# Patient Record
Sex: FEMALE | Race: WHITE | NOT HISPANIC OR LATINO | URBAN - NONMETROPOLITAN AREA
[De-identification: names, ages, dates, MRNs, and addresses within clinical notes are randomized per-mention and may not be internally consistent; named-entity substitution may affect disease eponyms.]

---

## 2018-08-28 ENCOUNTER — OFFICE VISIT (OUTPATIENT)
Dept: OBSTETRICS AND GYNECOLOGY | Facility: CLINIC | Age: 23
End: 2018-08-28

## 2018-08-28 VITALS
WEIGHT: 148 LBS | SYSTOLIC BLOOD PRESSURE: 118 MMHG | DIASTOLIC BLOOD PRESSURE: 64 MMHG | HEIGHT: 64 IN | BODY MASS INDEX: 25.27 KG/M2

## 2018-08-28 DIAGNOSIS — Z30.09 ENCOUNTER FOR GENERAL COUNSELING AND ADVICE ON CONTRACEPTIVE MANAGEMENT: ICD-10-CM

## 2018-08-28 DIAGNOSIS — R10.2 CHRONIC PELVIC PAIN IN FEMALE: Primary | ICD-10-CM

## 2018-08-28 DIAGNOSIS — N93.9 ABNORMAL UTERINE BLEEDING (AUB): ICD-10-CM

## 2018-08-28 DIAGNOSIS — G89.29 CHRONIC PELVIC PAIN IN FEMALE: Primary | ICD-10-CM

## 2018-08-28 LAB
HBA1C MFR BLD: 4.9 %
HCG INTACT+B SERPL-ACNC: <2.39 MIU/ML

## 2018-08-28 PROCEDURE — 99204 OFFICE O/P NEW MOD 45 MIN: CPT | Performed by: OBSTETRICS & GYNECOLOGY

## 2018-08-28 NOTE — PROGRESS NOTES
Subjective   Chief Complaint   Patient presents with   • Abdominal Pain     PATIENT STATES SHE WAS DIAGNOSED BY ANOTHER DOCTOR WITH ENDOMETRIOSIS, WIHTOUT SURGERY. LOOKING FOR ADVICE ON HOW TO DEAL WITH THE PAIN, NOT GOOD WITH TAKING BIRHTCONTROL AND PAIN IS GETTIN WORSE.     Kelley Salazar is a 22 y.o. year old  presenting to be seen for pelvic pain, abnormal uterine bleeding and contraception counseling.    She reports long-standing issues with pelvic pain.  The pain is intense located in bilateral lower quadrants and radiates down her legs and back.  The pain is cyclical in nature and worse with menses.  It has been present for many years going back to middle school.  She reports dyskinesia and dyspareunia.  Sex is uncomfortable but the pain worsens after completion of intercourse with a throbbing and aching sensation.  She denies dysuria and other urinary symptoms.  She has been told that she likely has endometriosis, but is never undergone laparoscopy.  She has been treated with various hormonal therapies over the years.  She was on oral birth control pills in middle school and high school.  These had a significant impact on her emotional and mental state so she discontinued them.  Recently, she was on Depo-Provera for roughly 1 year.  She discontinued this late last year.  While she was on Depo, her pain and bleeding were both improved.  This also affected her emotional state, so she is interested in discussing other treatment options.    She reports irregular menses when she is off of hormonal therapy.  She typically bleeds for 9-12 days at a time with only 15 days in between bleeding.  Again, her pain is at its worst while she bleeds so this is a significant portion of each month.  She does report heavy vaginal bleeding requiring tampon changes every hour when bleeding is at its heaviest.  Her last menstrual period was 8/15/18.  She reports that her thyroid was checked several months ago and was  "normal.  She also notes that they performed an ultrasound and told her that her \"female organs looked great\".    She is sexually active with one male partner.  She is not concerned about sexual transmitted infection screening today.  They use condoms occasionally.  She is interesting in discussing contraception options that are low maintenance.    She underwent a colonoscopy this year secondary to irritable bowel.  She reports having a Pap smear done last year that was normal per her report.  She denies any abnormal Pap smears.    She denies nausea, emesis, fevers, chills, significant weight changes, hair/skin/nail changes, dysuria, urinary frequency, palpitations, myalgia, dyspnea.     History  Past Medical History:   Diagnosis Date   • Endometriosis    • Irritable bowel    , Past Surgical History:   Procedure Laterality Date   • WISDOM TOOTH EXTRACTION     , Family History   Problem Relation Age of Onset   • Hypertension Father    • Prostate cancer Father    • Colon cancer Maternal Grandmother    , Social History   Substance Use Topics   • Smoking status: Current Every Day Smoker   • Smokeless tobacco: Never Used   • Alcohol use No   ,   (Not in a hospital admission) and Allergies:  Patient has no known allergies.    Smoking status: Current Every Day Smoker                                                   Packs/day: 0.00      Years: 0.00      Smokeless tobacco: Never Used                          Review of Systems  Pertinent items are noted in HPI, all other systems reviewed and negative       Objective   /64   Ht 162.6 cm (64\")   Wt 67.1 kg (148 lb)   BMI 25.40 kg/m²     Physical Exam:  General Appearance: alert, pleasant, appears stated age, interactive and cooperative  Head: normocephalic, without obvious abnormality and atraumatic  Eyes: lids and lashes normal and no icterus  Ears: ears appear intact with no abnormalities noted  Nose: nares normal, septum midline, mucosa normal and no drainage  Neck: " suppple, trachea midline and no thyromegaly  Back: no kyphosis present, no scoliosis present and range of motion normal  Lungs: respirations regular, respirations even and respirations unlabored  Abdomen: no masses, no hepatomegaly, no splenomegaly, soft non-tender, no guarding and no rebound tenderness  Extremities: moves extremities well, no edema, no cyanosis and no redness  Skin: no bleeding, bruising or rash and no lesions noted  Lymph Nodes: no palpable adenopathy  Neurologic: Cranial Nerves cranial nerves 2 - 12 grossly intact, Speech normal content and execusion, Coordination normal  Psych: normal mood and affect, oriented to person, time and place, thought content organized and appropriate judgment    Pelvis:  Pelvic: Clinical staff was present for exam  External genitalia:  normal appearance of the external genitalia including Bartholin's and [place]'s glands.  :  urethral meatus normal;  Vagina:  normal pink mucosa without prolapse or lesions.  Mild pelvic floor discomfort with palpation.  Cervix:  normal appearance.  Uterus:  normal size, shape and consistency.  Adnexa:  normal bimanual exam of the adnexa.    Review of Labs:   No data reviewed    Review of Imaging:  No data reviewed    Decision to obtain old records:  YES. - Pap smear, TSH, ultrasound, notes pertaining to previous treatment    Summarization of old records:  N/A    Independent review of image, tracing or specimen:  N/A       Assessment   1.  Cyclical pelvic pain, consistent with endometriosis  2.  Abnormal uterine bleeding with menometrorrhagia  3.  Encounter for general counseling on contraception     Plan      1. We discussed that her HPI is consistent with endometriosis.  We discussed this diagnosis and its ramifications.  We discussed that laparoscopy is the cold standard for diagnosis, however often we can attempt a try-and-see approach with medical therapy.  If we can improve her pain and bleeding with medical therapy, we can save  her a surgery.  Given that she improved with both birth control pills and Depo, I am optimistic that medical therapy will yield good results.  I recommend the Mirena IUD as it has been well studied in both pain and bleeding populations and will provide excellent birth control.  After discussion including risks, benefits and alternatives, she would like to move forward with IUD placement.  Given that she is self-pay, this can be expensive.  Plan A will be to obtain this IUD from the local health department.  Plan B will be obtaining a Liletta device through a specialty pharmacy.  We discussed that Liletta is essentially a generic form of Mirena and that it is significantly cheaper.  She will return for placement of an IUD with next menses.    2.  Her bleeding seems to be both heavy and irregular when she is not on hormonal therapy.  We will obtain old records to better understand what is already been done and workup.  She reports her thyroid was recently checked and found to be normal.  We'll verify this resolved.  I did order a hemoglobin A1c today for screening as well as an hCG.  We discussed the good bleeding profile with Mirena and that this device should adequately address this issue.    3.  Given her attempts with other therapy modalities, the IUD seems to be the best option.  I recommended consistent barrier contraception until the IUD can be placed.     Follow up prn for IUD placement    Samuel Estrella MD  Obstetrics and Gynecology  [facility]

## 2018-08-29 PROBLEM — R10.2 CHRONIC PELVIC PAIN IN FEMALE: Status: ACTIVE | Noted: 2018-08-29

## 2018-08-29 PROBLEM — G89.29 CHRONIC PELVIC PAIN IN FEMALE: Status: ACTIVE | Noted: 2018-08-29

## 2019-08-24 ENCOUNTER — OFFICE VISIT (OUTPATIENT)
Dept: RETAIL CLINIC | Facility: CLINIC | Age: 24
End: 2019-08-24

## 2019-08-24 VITALS
BODY MASS INDEX: 26.61 KG/M2 | SYSTOLIC BLOOD PRESSURE: 108 MMHG | WEIGHT: 155 LBS | DIASTOLIC BLOOD PRESSURE: 72 MMHG | TEMPERATURE: 99.2 F | HEART RATE: 98 BPM | RESPIRATION RATE: 16 BRPM | OXYGEN SATURATION: 98 %

## 2019-08-24 DIAGNOSIS — J01.40 ACUTE NON-RECURRENT PANSINUSITIS: Primary | ICD-10-CM

## 2019-08-24 DIAGNOSIS — J02.9 ACUTE PHARYNGITIS, UNSPECIFIED ETIOLOGY: ICD-10-CM

## 2019-08-24 LAB
EXPIRATION DATE: NORMAL
INTERNAL CONTROL: NORMAL
Lab: NORMAL
S PYO AG THROAT QL: NEGATIVE

## 2019-08-24 PROCEDURE — 87880 STREP A ASSAY W/OPTIC: CPT | Performed by: NURSE PRACTITIONER

## 2019-08-24 PROCEDURE — 99213 OFFICE O/P EST LOW 20 MIN: CPT | Performed by: NURSE PRACTITIONER

## 2019-08-24 RX ORDER — AMOXICILLIN 875 MG/1
875 TABLET, COATED ORAL 2 TIMES DAILY
Qty: 20 TABLET | Refills: 0 | Status: SHIPPED | OUTPATIENT
Start: 2019-08-24 | End: 2019-09-03

## 2019-08-24 RX ORDER — FLUTICASONE PROPIONATE 50 MCG
2 SPRAY, SUSPENSION (ML) NASAL DAILY PRN
Qty: 1 BOTTLE | Refills: 0 | Status: SHIPPED | OUTPATIENT
Start: 2019-08-24 | End: 2019-09-23

## 2019-08-24 RX ORDER — LORATADINE 10 MG/1
10 TABLET ORAL DAILY
Qty: 30 TABLET | Refills: 0 | Status: SHIPPED | OUTPATIENT
Start: 2019-08-24 | End: 2019-09-13

## 2019-08-24 NOTE — PROGRESS NOTES
Juan Salazar is a 23 y.o. female.     Sore Throat    This is a new problem. Episode onset: one week ago. The problem has been unchanged. Neither side of throat is experiencing more pain than the other. There has been no fever (up to 99.3). The pain is at a severity of 5/10. Associated symptoms include congestion, coughing (mild), ear pain (burning sensation at times), headaches, a hoarse voice and vomiting (3x since being sick). Pertinent negatives include no abdominal pain, diarrhea, drooling, ear discharge, plugged ear sensation, neck pain, shortness of breath, stridor, swollen glands or trouble swallowing. She has had no exposure to strep. Treatments tried: nyquil, throat tea. The treatment provided mild relief.        No current outpatient medications on file prior to visit.     No current facility-administered medications on file prior to visit.        No Known Allergies    Past Medical History:   Diagnosis Date   • Endometriosis    • Irritable bowel        Past Surgical History:   Procedure Laterality Date   • WISDOM TOOTH EXTRACTION         Family History   Problem Relation Age of Onset   • Hypertension Father    • Prostate cancer Father    • Colon cancer Maternal Grandmother        Social History     Socioeconomic History   • Marital status: Single     Spouse name: Not on file   • Number of children: Not on file   • Years of education: Not on file   • Highest education level: Not on file   Tobacco Use   • Smoking status: Former Smoker     Last attempt to quit: 2019     Years since quittin.6   • Smokeless tobacco: Never Used   Substance and Sexual Activity   • Alcohol use: No   • Drug use: No   • Sexual activity: Defer       Review of Systems   Constitutional: Positive for activity change, appetite change, chills and diaphoresis. Negative for fever.   HENT: Positive for congestion, ear pain (burning sensation at times), hoarse voice, sinus pressure and sore throat. Negative for drooling,  ear discharge, rhinorrhea and trouble swallowing.    Respiratory: Positive for cough (mild). Negative for shortness of breath and stridor.    Cardiovascular: Negative for chest pain.   Gastrointestinal: Positive for nausea and vomiting (3x since being sick). Negative for abdominal pain and diarrhea.   Musculoskeletal: Negative for neck pain.   Skin: Negative for rash.   Neurological: Positive for headaches.       /72   Pulse 98   Temp 99.2 °F (37.3 °C)   Resp 16   Wt 70.3 kg (155 lb)   SpO2 98%   Breastfeeding? No   BMI 26.61 kg/m²     Objective   Physical Exam   Constitutional: She is oriented to person, place, and time. She appears well-developed and well-nourished. She is cooperative.   HENT:   Head: Normocephalic.   Right Ear: Tympanic membrane, external ear and ear canal normal.   Left Ear: Tympanic membrane, external ear and ear canal normal.   Nose: Rhinorrhea present. Right sinus exhibits maxillary sinus tenderness and frontal sinus tenderness. Left sinus exhibits maxillary sinus tenderness and frontal sinus tenderness.   Mouth/Throat: Uvula is midline and mucous membranes are normal. Posterior oropharyngeal erythema present. Tonsils are 1+ on the right. Tonsils are 1+ on the left. No tonsillar exudate.   Cobblestone appearance   Eyes: Conjunctivae, EOM and lids are normal.   Cardiovascular: Normal rate, regular rhythm and normal heart sounds.   Pulmonary/Chest: Effort normal and breath sounds normal. No accessory muscle usage. No respiratory distress.   Lymphadenopathy:     She has no cervical adenopathy.   Neurological: She is alert and oriented to person, place, and time.   Skin: Skin is warm, dry and intact.   Psychiatric: She has a normal mood and affect. Her speech is normal and behavior is normal.         Assessment/Plan   Kelley was seen today for sore throat.    Diagnoses and all orders for this visit:    Acute non-recurrent pansinusitis  -     amoxicillin (AMOXIL) 875 MG tablet; Take 1  tablet by mouth 2 (Two) Times a Day for 10 days.  -     loratadine (CLARITIN) 10 MG tablet; Take 1 tablet by mouth Daily for 30 days.  -     fluticasone (FLONASE) 50 MCG/ACT nasal spray; 2 sprays into the nostril(s) as directed by provider Daily As Needed for Allergies for up to 30 days.    Acute pharyngitis, unspecified etiology  -     POCT rapid strep A        Results for orders placed or performed in visit on 08/24/19   POCT rapid strep A   Result Value Ref Range    Rapid Strep A Screen Negative Negative, VALID, INVALID, Not Performed    Internal Control Passed Passed    Lot Number [id number]     Expiration Date 3/21        Follow up with PCP or go to the nearest emergency room if symptoms worsen or fail to improve.

## 2019-08-24 NOTE — PATIENT INSTRUCTIONS
Sinusitis, Adult  Sinusitis is soreness and inflammation of your sinuses. Sinuses are hollow spaces in the bones around your face. Your sinuses are located:  · Around your eyes.  · In the middle of your forehead.  · Behind your nose.  · In your cheekbones.  Your sinuses and nasal passages are lined with a stringy fluid (mucus). Mucus normally drains out of your sinuses. When your nasal tissues become inflamed or swollen, mucus can become trapped or blocked. Blocked or trapped mucus makes it difficult for air to flow through your sinuses. This allows bacteria, viruses, and funguses to grow, which leads to infection. Most infections of the sinuses are caused by a virus.  Sinusitis can develop quickly. It can last for 7?10 days (acute) or for more than 12 weeks (chronic). Sinusitis often develops after a cold.  What are the causes?  This condition is caused by anything that creates swelling in the sinuses or stops mucus from draining, including:  · Allergies.  · Asthma.  · Bacterial or viral infection.  · Abnormally shaped bones between the nasal passages.  · Nasal growths that contain mucus (nasal polyps).  · Narrow sinus openings.  · Pollutants, such as chemicals or irritants in the air.  · A foreign object stuck in the nose.  · A fungal infection. This is rare.  What increases the risk?  The following factors may make you more likely to develop this condition:  · Having allergies or asthma.  · Having had a recent cold or respiratory tract infection.  · Having structural deformities or blockages in your nose or sinuses.  · Having a weak immune system.  · Doing a lot of swimming or diving.  · Overusing nasal sprays.  · Smoking.  What are the signs or symptoms?  The main symptoms of this condition are pain and a feeling of pressure around the affected sinuses. Other symptoms include:  · Upper toothache.  · Earache.  · Headache.  · Bad breath.  · Decreased sense of smell and taste.  · A cough that may get worse at  night.  · Fatigue.  · Fever.  · Thick drainage from your nose. The drainage is often green and it may contain pus (purulent).  · Stuffy nose or congestion.  · Postnasal drip. This is when extra mucus collects in the throat or back of the nose.  · Swelling and warmth over the affected sinuses.  · Sore throat.  · Sensitivity to light.  How is this diagnosed?  This condition is diagnosed based on symptoms, a medical history, and a physical exam. To find out if your condition is acute or chronic, your health care provider may:  · Look in your nose for signs of nasal polyps.  · Tap over the affected sinus to check for signs of infection.  · View the inside of your sinuses using an imaging device that has a light attached (endoscope).  If your health care provider suspects that you have chronic sinusitis, you may also:  · Be tested for allergies.  · Have a sample of mucus taken from your nose (nasal culture) and checked for bacteria.  · Have a mucus sample examined to see if your sinusitis is related to an allergy.  If your sinusitis does not respond to treatment and it lasts longer than 8 weeks, you may have an MRI or CT scan to check your sinuses. These scans also help to determine how severe your infection is.  In rare cases, a bone biopsy may be done to rule out more serious types of fungal sinus disease.  How is this treated?  Treatment for sinusitis depends on the cause and whether your condition is chronic or acute. If a virus is causing your sinusitis, your symptoms will go away on their own within 10 days. You may be given medicines to relieve your symptoms, including:  · Topical nasal decongestants. They shrink swollen nasal passages and let mucus drain from your sinuses.  · Antihistamines. These drugs block inflammation that is triggered by allergies. This can help to ease swelling in your nose and sinuses.  · Topical nasal corticosteroids. These are nasal sprays that ease inflammation and swelling in your nose  and sinuses.  · Nasal saline washes. These rinses can help to get rid of thick mucus in your nose.  If your condition is caused by bacteria, your health care provider may recommend waiting to see if your symptoms improve. Most bacterial infections will get better without antibiotic medicine. If you have a severe infection or a weak immune system, you may be prescribed antibiotics.  Surgery may be needed to correct underlying conditions, such as narrow nasal passages. Surgery may also be needed to remove polyps.  Follow these instructions at home:  Medicines  · Take, use, or apply over-the-counter and prescription medicines only as told by your health care provider. These may include nasal sprays.  · If you were prescribed an antibiotic, take it as told by your health care provider. Do not stop taking the antibiotic even if you start to feel better.  Hydrate and Humidify  · Drink enough water to keep your urine clear or pale yellow. Staying hydrated will help to thin your mucus.  · Use a cool mist humidifier to keep the humidity level in your home above 50%.  · Inhale steam for 10-15 minutes, 3-4 times a day or as told by your health care provider. You can do this in the bathroom while a hot shower is running.  · Limit your exposure to cool or dry air.  Rest  · Rest as much as possible.  · Sleep with your head raised (elevated).  · Make sure to get enough sleep each night.  General instructions  · Apply a warm, moist washcloth to your face 3-4 times a day or as told by your health care provider. This will help with discomfort.  · Wash your hands often with soap and water to reduce your exposure to viruses and other germs. If soap and water are not available, use hand .  · Do not smoke. Avoid being around people who are smoking (secondhand smoke).  · Keep all follow-up visits as told by your health care provider. This is important.  Contact a health care provider if:  · You have a fever.  · Your symptoms get  worse.  · Your symptoms do not improve within 10 days.  Get help right away if:  · You have a severe headache.  · You have persistent vomiting.  · You have pain or swelling around your face or eyes.  · You have vision problems.  · You develop confusion.  · Your neck is stiff.  · You have trouble breathing.  This information is not intended to replace advice given to you by your health care provider. Make sure you discuss any questions you have with your health care provider.  Document Released: 12/18/2006 Document Revised: 06/28/2018 Document Reviewed: 10/12/2016  [employer] Interactive Patient Education © 2019 [employer]  Sore Throat  A sore throat is pain, burning, irritation, or scratchiness in the throat. When you have a sore throat, you may feel pain or tenderness in your throat when you swallow or talk.  Many things can cause a sore throat, including:  · An infection.  · Seasonal allergies.  · Dryness in the air.  · Irritants, such as smoke or pollution.  · Gastroesophageal reflux disease (GERD).  · A tumor.  A sore throat is often the first sign of another sickness. It may happen with other symptoms, such as coughing, sneezing, fever, and swollen neck glands. Most sore throats go away without medical treatment.  Follow these instructions at home:    · Take over-the-counter medicines only as told by your health care provider.  · Drink enough fluids to keep your urine clear or pale yellow.  · Rest as needed.  · To help with pain, try:  ? Sipping warm liquids, such as broth, herbal tea, or warm water.  ? Eating or drinking cold or frozen liquids, such as frozen ice pops.  ? Gargling with a salt-water mixture 3-4 times a day or as needed. To make a salt-water mixture, completely dissolve ½-1 tsp of salt in 1 cup of warm water.  ? Sucking on hard candy or throat lozenges.  ? Putting a cool-mist humidifier in your bedroom at night to moisten the air.  ? Sitting in the bathroom with the door closed for 5-10 minutes  while you run hot water in the shower.  · Do not use any tobacco products, such as cigarettes, chewing tobacco, and e-cigarettes. If you need help quitting, ask your health care provider.  Contact a health care provider if:  · You have a fever for more than 2-3 days.  · You have symptoms that last (are persistent) for more than 2-3 days.  · Your throat does not get better within 7 days.  · You have a fever and your symptoms suddenly get worse.  Get help right away if:  · You have difficulty breathing.  · You cannot swallow fluids, soft foods, or your saliva.  · You have increased swelling in your throat or neck.  · You have persistent nausea and vomiting.  This information is not intended to replace advice given to you by your health care provider. Make sure you discuss any questions you have with your health care provider.  Document Released: 01/25/2006 Document Revised: 08/13/2017 Document Reviewed: 10/07/2016  [employer] Interactive Patient Education © 2019 [employer]

## 2019-09-13 ENCOUNTER — OFFICE VISIT (OUTPATIENT)
Dept: RETAIL CLINIC | Facility: CLINIC | Age: 24
End: 2019-09-13

## 2019-09-13 VITALS
WEIGHT: 156 LBS | BODY MASS INDEX: 26.78 KG/M2 | OXYGEN SATURATION: 99 % | TEMPERATURE: 97.7 F | RESPIRATION RATE: 16 BRPM | DIASTOLIC BLOOD PRESSURE: 64 MMHG | HEART RATE: 71 BPM | SYSTOLIC BLOOD PRESSURE: 106 MMHG

## 2019-09-13 DIAGNOSIS — R59.1 LYMPHADENOPATHY: ICD-10-CM

## 2019-09-13 DIAGNOSIS — R50.9 FEVER, UNSPECIFIED FEVER CAUSE: Primary | ICD-10-CM

## 2019-09-13 LAB
EXPIRATION DATE: NORMAL
FLUAV AG NPH QL: NEGATIVE
FLUBV AG NPH QL: NEGATIVE
HETEROPH AB SER QL LA: NEGATIVE
INTERNAL CONTROL: NORMAL
Lab: NORMAL
S PYO AG THROAT QL: NEGATIVE

## 2019-09-13 PROCEDURE — 87880 STREP A ASSAY W/OPTIC: CPT | Performed by: NURSE PRACTITIONER

## 2019-09-13 PROCEDURE — 87804 INFLUENZA ASSAY W/OPTIC: CPT | Performed by: NURSE PRACTITIONER

## 2019-09-13 PROCEDURE — 86308 HETEROPHILE ANTIBODY SCREEN: CPT | Performed by: NURSE PRACTITIONER

## 2019-09-13 PROCEDURE — 99213 OFFICE O/P EST LOW 20 MIN: CPT | Performed by: NURSE PRACTITIONER

## 2019-09-13 RX ORDER — AMOXICILLIN AND CLAVULANATE POTASSIUM 875; 125 MG/1; MG/1
1 TABLET, FILM COATED ORAL EVERY 12 HOURS SCHEDULED
Qty: 20 TABLET | Refills: 0 | Status: SHIPPED | OUTPATIENT
Start: 2019-09-13 | End: 2019-09-23

## 2019-09-13 RX ORDER — PREDNISONE 20 MG/1
20 TABLET ORAL 3 TIMES DAILY
Qty: 9 TABLET | Refills: 0 | Status: SHIPPED | OUTPATIENT
Start: 2019-09-13 | End: 2019-09-16

## 2019-09-13 NOTE — PATIENT INSTRUCTIONS
Lymphadenopathy    Lymphadenopathy means that your lymph glands are swollen or larger than normal (enlarged). Lymph glands, also called lymph nodes, are collections of tissue that filter bacteria, viruses, and waste from your bloodstream. They are part of your body's disease-fighting system (immune system), which protects your body from germs.  There may be different causes of lymphadenopathy, depending on where it is in your body. Some types go away on their own. Lymphadenopathy can occur anywhere that you have lymph glands, including these areas:  · Neck (cervical lymphadenopathy).  · Chest (mediastinal lymphadenopathy).  · Lungs (hilar lymphadenopathy).  · Underarms (axillary lymphadenopathy).  · Groin (inguinal lymphadenopathy).  When your immune system responds to germs, infection-fighting cells and fluid build up in your lymph glands. This causes some swelling and enlargement. If the lymph glands do not go back to normal after you have an infection or disease, your health care provider may do tests. These tests help to monitor your condition and find the reason why the glands are still swollen and enlarged.  Follow these instructions at home:  · Get plenty of rest.  · Take over-the-counter and prescription medicines only as told by your health care provider. Your health care provider may recommend over-the-counter medicines for pain.  · If directed, apply heat to swollen lymph glands as often as told by your health care provider. Use the heat source that your health care provider recommends, such as a moist heat pack or a heating pad.  ? Place a towel between your skin and the heat source.  ? Leave the heat on for 20-30 minutes.  ? Remove the heat if your skin turns bright red. This is especially important if you are unable to feel pain, heat, or cold. You may have a greater risk of getting burned.  · Check your affected lymph glands every day for changes. Check other lymph gland areas as told by your health  care provider. Check for changes such as:  ? More swelling.  ? Sudden increase in size.  ? Redness or pain.  ? Hardness.  · Keep all follow-up visits as told by your health care provider. This is important.  Contact a health care provider if you have:  · Swelling that gets worse or spreads to other areas.  · Problems with breathing.  · Lymph glands that:  ? Are still swollen after 2 weeks.  ? Have suddenly gotten bigger.  ? Are red, painful, or hard.  · A fever or chills.  · Fatigue.  · A sore throat.  · Pain in your abdomen.  · Weight loss.  · Night sweats.  Get help right away if you have:  · Fluid leaking from an enlarged lymph gland.  · Severe pain.  · Chest pain.  · Shortness of breath.  Summary  · Lymphadenopathy means that your lymph glands are swollen or larger than normal (enlarged).  · Lymph glands (also called lymph nodes) are collections of tissue that filter bacteria, viruses, and waste from the bloodstream. They are part of your body's disease-fighting system (immune system).  · Lymphadenopathy can occur anywhere that you have lymph glands.  · If your enlarged and swollen lymph glands do not go back to normal after you have an infection or disease, your health care provider may do tests to monitor your condition and find the reason why the glands are still swollen and enlarged.  · Check your affected lymph glands every day for changes. Check other lymph gland areas as told by your health care provider.  This information is not intended to replace advice given to you by your health care provider. Make sure you discuss any questions you have with your health care provider.  Document Released: 09/26/2009 Document Revised: 11/02/2018 Document Reviewed: 11/02/2018  [employer] Interactive Patient Education © 2019 [employer]  Fever, Adult    A fever is an increase in the body’s temperature. It is usually defined as a temperature of 100°F (38°C) or higher. Brief mild or moderate fevers generally have no  long-term effects, and they often do not require treatment. Moderate or high fevers may make you feel uncomfortable and can sometimes be a sign of a serious illness or disease. The sweating that may occur with repeated or prolonged fever may also cause dehydration.  Fever is confirmed by taking a temperature with a thermometer. A measured temperature can vary with:  · Age.  · Time of day.  · Location of the thermometer:  ? Mouth (oral).  ? Rectum (rectal).  ? Ear (tympanic).  ? Underarm (axillary).  ? Forehead (temporal).  Follow these instructions at home:  Pay attention to any changes in your symptoms. Take these actions to help with your condition:  · Take over-the counter and prescription medicines only as told by your health care provider. Follow the dosing instructions carefully.  · If you were prescribed an antibiotic medicine, take it as told by your health care provider. Do not stop taking the antibiotic even if you start to feel better.  · Rest as needed.  · Drink enough fluid to keep your urine clear or pale yellow. This helps to prevent dehydration.  · Sponge yourself or bathe with room-temperature water to help reduce your body temperature as needed. Do not use ice water.  · Do not overbundle yourself in blankets or heavy clothes.  Contact a health care provider if:  · You vomit.  · You cannot eat or drink without vomiting.  · You have diarrhea.  · You have pain when you urinate.  · Your symptoms do not improve with treatment.  · You develop new symptoms.  · You develop excessive weakness.  Get help right away if:  · You have shortness of breath or have trouble breathing.  · You are dizzy or you faint.  · You are disoriented or confused.  · You develop signs of dehydration, such as a dry mouth, decreased urination, or paleness.  · You develop severe pain in your abdomen.  · You have persistent vomiting or diarrhea.  · You develop a skin rash.  · Your symptoms suddenly get worse.  This information is not  intended to replace advice given to you by your health care provider. Make sure you discuss any questions you have with your health care provider.  Document Released: 06/13/2002 Document Revised: 08/01/2018 Document Reviewed: 02/11/2016  [employer] Interactive Patient Education © 2019 [employer]

## 2019-09-13 NOTE — PROGRESS NOTES
Juan Salazar is a 23 y.o. female.     Diagnosed with Sinusitis and sore throat on 8/24/19. Completed amoxil as prescribed. Reports symptoms were better while taking amoxil but symptoms returned two days ago, worsening last night. Reports feeling like the left side of her neck is sore and swollen, along with salivary gland swelling under left side of tongue. Denies pain with swallowing.       Sore Throat    The problem has been gradually worsening. The pain is worse on the left side. Maximum temperature: up to 101. The pain is at a severity of 2/10. Associated symptoms include ear pain (left), a hoarse voice, neck pain, swollen glands and vomiting. Pertinent negatives include no abdominal pain, congestion, coughing, diarrhea, drooling, ear discharge, headaches, plugged ear sensation, shortness of breath, stridor or trouble swallowing. Associated symptoms comments: Chills, sweats, bodyaches. She has had no exposure to strep or mono. Treatments tried: motrin. The treatment provided mild relief.      Reports vaccines up to date.    Current Outpatient Medications on File Prior to Visit   Medication Sig Dispense Refill   • fluticasone (FLONASE) 50 MCG/ACT nasal spray 2 sprays into the nostril(s) as directed by provider Daily As Needed for Allergies for up to 30 days. 1 bottle 0   • [DISCONTINUED] loratadine (CLARITIN) 10 MG tablet Take 1 tablet by mouth Daily for 30 days. 30 tablet 0     No current facility-administered medications on file prior to visit.        No Known Allergies    Past Medical History:   Diagnosis Date   • Endometriosis    • Irritable bowel        Past Surgical History:   Procedure Laterality Date   • WISDOM TOOTH EXTRACTION         Family History   Problem Relation Age of Onset   • Hypertension Father    • Prostate cancer Father    • Colon cancer Maternal Grandmother        Social History     Socioeconomic History   • Marital status: Single     Spouse name: Not on file   • Number of  children: Not on file   • Years of education: Not on file   • Highest education level: Not on file   Tobacco Use   • Smoking status: Former Smoker     Last attempt to quit: 2019     Years since quittin.6   • Smokeless tobacco: Never Used   Substance and Sexual Activity   • Alcohol use: No   • Drug use: No   • Sexual activity: Defer       Review of Systems   Constitutional: Positive for activity change, appetite change, chills, diaphoresis and fever.   HENT: Positive for ear pain (left), hoarse voice and sore throat. Negative for congestion, drooling, ear discharge, rhinorrhea, sinus pressure, sneezing and trouble swallowing.    Respiratory: Negative for cough, shortness of breath and stridor.    Cardiovascular: Negative for chest pain.   Gastrointestinal: Positive for nausea and vomiting. Negative for abdominal pain and diarrhea.   Musculoskeletal: Positive for neck pain.   Skin: Negative for rash.   Neurological: Negative for headaches.       /64   Pulse 71   Temp 97.7 °F (36.5 °C)   Resp 16   Wt 70.8 kg (156 lb)   SpO2 99%   Breastfeeding? No   BMI 26.78 kg/m²     Objective   Physical Exam   Constitutional: She is oriented to person, place, and time. She appears well-developed and well-nourished. She is cooperative.   HENT:   Head: Normocephalic.       Right Ear: Tympanic membrane, external ear and ear canal normal.   Left Ear: Tympanic membrane, external ear and ear canal normal.   Nose: Nose normal. Right sinus exhibits no maxillary sinus tenderness and no frontal sinus tenderness. Left sinus exhibits no maxillary sinus tenderness and no frontal sinus tenderness.   Mouth/Throat: Uvula is midline, oropharynx is clear and moist and mucous membranes are normal. No tonsillar exudate.   Eyes: Conjunctivae, EOM and lids are normal.   Cardiovascular: Normal rate, regular rhythm and normal heart sounds.   Pulmonary/Chest: Effort normal and breath sounds normal. No accessory muscle usage. No respiratory  distress.   Lymphadenopathy:        Head (right side): Tonsillar adenopathy present.        Head (left side): Submandibular and tonsillar adenopathy present.   Tender, mobile   Neurological: She is alert and oriented to person, place, and time.   Skin: Skin is warm, dry and intact.   Psychiatric: She has a normal mood and affect. Her speech is normal and behavior is normal.         Assessment/Plan   Kelley was seen today for sore throat.    Diagnoses and all orders for this visit:    Fever, unspecified fever cause  -     amoxicillin-clavulanate (AUGMENTIN) 875-125 MG per tablet; Take 1 tablet by mouth Every 12 (Twelve) Hours for 10 days.  -     predniSONE (DELTASONE) 20 MG tablet; Take 1 tablet by mouth 3 (Three) Times a Day for 3 days.  -     POC Infectious Mononucleosis Antibody  -     POCT rapid strep A  -     POCT Influenza A/B  -     Beta Strep Culture, Throat - Swab, Throat    Lymphadenopathy  -     amoxicillin-clavulanate (AUGMENTIN) 875-125 MG per tablet; Take 1 tablet by mouth Every 12 (Twelve) Hours for 10 days.  -     predniSONE (DELTASONE) 20 MG tablet; Take 1 tablet by mouth 3 (Three) Times a Day for 3 days.  -     POC Infectious Mononucleosis Antibody  -     POCT rapid strep A  -     POCT Influenza A/B  -     Beta Strep Culture, Throat - Swab, Throat        Results for orders placed or performed in visit on 09/13/19   POC Infectious Mononucleosis Antibody   Result Value Ref Range    Monospot Negative Negative    Internal Control Passed Passed    Lot Number [id number]     Expiration Date 4/21    POCT rapid strep A   Result Value Ref Range    Rapid Strep A Screen Negative Negative, VALID, INVALID, Not Performed    Internal Control Passed Passed    Lot Number [id number]     Expiration Date 2/21    POCT Influenza A/B   Result Value Ref Range    Rapid Influenza A Ag Negative Negative    Rapid Influenza B Ag Negative Negative    Internal Control Passed Passed    Lot Number [id number]     Expiration Date 5/21       Discussed with patient possibility of infected salivary gland. Instructed to take meds as prescribed. Eat lemon drops to help stimulate salivary gland production. You will be notified when strep culture results return.   APRN made appt to establish care with PCP for 9/20/2019 at 1:45 with Dr. Carrizales.  Go to the nearest emergency room if symptoms worsen or fail to improve prior to PCP appt.   Patient verbalized understanding.

## 2019-09-16 ENCOUNTER — TELEPHONE (OUTPATIENT)
Dept: RETAIL CLINIC | Facility: CLINIC | Age: 24
End: 2019-09-16

## 2019-09-16 LAB — S PYO THROAT QL CULT: NEGATIVE

## 2019-09-16 NOTE — TELEPHONE ENCOUNTER
Patient notified of negative strep culture. Patient reports that she did develop lymphadenopathy in her groin area after being seen, but all symptoms and lymph node swelling (cervical and groin) are gone at this time. Instructed patient to complete antibiotic as prescribed due to other lymphadenopathy developing and to keep appt to establish care with PCP this week. Patient verbalized understanding.

## 2020-01-29 ENCOUNTER — OFFICE VISIT (OUTPATIENT)
Dept: OBSTETRICS AND GYNECOLOGY | Facility: CLINIC | Age: 25
End: 2020-01-29

## 2020-01-29 VITALS
DIASTOLIC BLOOD PRESSURE: 78 MMHG | SYSTOLIC BLOOD PRESSURE: 124 MMHG | WEIGHT: 150 LBS | HEIGHT: 65 IN | BODY MASS INDEX: 24.99 KG/M2

## 2020-01-29 DIAGNOSIS — Z30.430 ENCOUNTER FOR IUD INSERTION: Primary | ICD-10-CM

## 2020-01-29 PROBLEM — N94.6 DYSMENORRHEA: Status: ACTIVE | Noted: 2020-01-29

## 2020-01-29 PROBLEM — N92.0 MENORRHAGIA WITH REGULAR CYCLE: Status: ACTIVE | Noted: 2020-01-29

## 2020-01-29 LAB
B-HCG UR QL: NEGATIVE
EXPIRATION DATE: NORMAL
INTERNAL NEGATIVE CONTROL: NEGATIVE
INTERNAL POSITIVE CONTROL: POSITIVE
Lab: NORMAL

## 2020-01-29 PROCEDURE — 81025 URINE PREGNANCY TEST: CPT | Performed by: OBSTETRICS & GYNECOLOGY

## 2020-01-29 PROCEDURE — 58300 INSERT INTRAUTERINE DEVICE: CPT | Performed by: OBSTETRICS & GYNECOLOGY

## 2020-01-29 NOTE — PROGRESS NOTES
IUD Insertion    Patient's last menstrual period was 01/20/2020.    Date of procedure:  1/29/2020    Risks and benefits discussed? yes  All questions answered? yes  Consents given by The patient  Written consent obtained? yes    Local anesthesia used:  no    Procedure documentation:    After verifying the patient had a low probability of being pregnant and met the criteria for insertion, a sterile speculum has placed and the cervix was cleansed with an antiseptic solution.  Vaginal discharge was scant.  The anterior lip of the cervix was grasped with a tenaculum and the uterine cavity was gently sounded. There was no difficulty passing the sound through the cervix.  Cervical dilation did not need to be performed prior to placing the IUD.  The uterus was anteverted and sounded to 7 cms.  The Mirena was then prepared per the manufacturers instructions.    The Mirena was advanced to a point 2 cms from the fundus and then the arms were released from the sheath.  The device was advanced to the fundus and the device was released fully from the sheath.. The string was cut 5 cms in length.  Bleeding from the cervix was scant.    She tolerated the procedure without any difficulty.     Post procedure instructions: It was reviewed that the Mirena will not alter the timing of when she bleeds but it may decrease the quantity of flow and cramps.  Roughly 30% of people will be amenorrheic over time.  Efficacy rate of 99.2% over 5 years was discussed.  Spontaneous expulsion rate of 1-2% was also discussed.  If she has any issue with fever or excessive bleeding or pain she is to call the office immediately.  Otherwise I would like to see her back in 5 weeks for f/u appt.    Samuel Estrella MD  Obstetrics and Gynecology  [facility]

## 2020-03-04 ENCOUNTER — OFFICE VISIT (OUTPATIENT)
Dept: OBSTETRICS AND GYNECOLOGY | Facility: CLINIC | Age: 25
End: 2020-03-04

## 2020-03-04 VITALS
BODY MASS INDEX: 25.33 KG/M2 | SYSTOLIC BLOOD PRESSURE: 110 MMHG | DIASTOLIC BLOOD PRESSURE: 70 MMHG | HEIGHT: 65 IN | WEIGHT: 152 LBS

## 2020-03-04 DIAGNOSIS — Z97.5 IUD (INTRAUTERINE DEVICE) IN PLACE: Primary | ICD-10-CM

## 2020-03-04 PROCEDURE — 99212 OFFICE O/P EST SF 10 MIN: CPT | Performed by: OBSTETRICS & GYNECOLOGY

## 2020-03-04 RX ORDER — AMOXICILLIN 875 MG/1
TABLET, COATED ORAL
COMMUNITY
Start: 2020-03-02 | End: 2020-06-25

## 2020-03-05 PROBLEM — Z97.5 IUD (INTRAUTERINE DEVICE) IN PLACE: Status: ACTIVE | Noted: 2020-03-05

## 2020-03-05 NOTE — PROGRESS NOTES
"Chief Complaint   Patient presents with   • Follow-up     5 week IUD check, states she has been bleeding since insertion and has some painful cramps at times.       24 y.o.  female who presents for an IUD string check.    She has no complaints today. She has almost continuous spotting. She has mild, intermittent, short-lived cramps. No problems with sex.    Vitals:    20 1452   BP: 110/70   Weight: 68.9 kg (152 lb)   Height: 165.1 cm (65\")       PHYSICAL EXAM   General appearance: well nourished, well hydrated, no acute distress  Abdomen: soft, non distended, non-tender, no masses  Mental Status Exam:   · Judgment, insight: intact  · Orientation: oriented to time, place, and person  · Memory: intact for recent and remote events  · Mood and affect: no depression, anxiety, or agitation  Cervix: Normal appearance, IUD strings present at 5 cm --> trimmed to 3cm    IMPRESSION/PLAN:    IUD in appropriate position  Continue expectant management for now    RTC as needed or for annual visits    Samuel Estrella MD  Obstetrics and Gynecology  [facility]    "

## 2020-06-25 ENCOUNTER — OFFICE VISIT (OUTPATIENT)
Dept: INTERNAL MEDICINE | Facility: CLINIC | Age: 25
End: 2020-06-25

## 2020-06-25 VITALS
WEIGHT: 152 LBS | DIASTOLIC BLOOD PRESSURE: 70 MMHG | BODY MASS INDEX: 25.33 KG/M2 | HEIGHT: 65 IN | HEART RATE: 61 BPM | TEMPERATURE: 98.4 F | SYSTOLIC BLOOD PRESSURE: 116 MMHG | OXYGEN SATURATION: 100 %

## 2020-06-25 DIAGNOSIS — Z76.89 ENCOUNTER TO ESTABLISH CARE: Primary | ICD-10-CM

## 2020-06-25 DIAGNOSIS — F41.1 GENERALIZED ANXIETY DISORDER: ICD-10-CM

## 2020-06-25 PROCEDURE — 99203 OFFICE O/P NEW LOW 30 MIN: CPT | Performed by: PHYSICIAN ASSISTANT

## 2020-06-25 RX ORDER — DESVENLAFAXINE 25 MG/1
25 TABLET, EXTENDED RELEASE ORAL DAILY
Qty: 90 TABLET | Refills: 1 | Status: SHIPPED | OUTPATIENT
Start: 2020-06-25 | End: 2020-07-27 | Stop reason: SDUPTHER

## 2020-06-25 RX ORDER — BUSPIRONE HYDROCHLORIDE 5 MG/1
TABLET ORAL
Qty: 180 TABLET | Refills: 1 | Status: SHIPPED | OUTPATIENT
Start: 2020-06-25 | End: 2022-06-24

## 2020-06-25 NOTE — PROGRESS NOTES
Subjective   Kelley Salazar is a 24 y.o. female who presents to [facility].    Chief Complaint   Patient presents with   • [facility] Care     anxiety       HPI: Anxiety troubles long term which she dealt with by smoking marijuana regularly until she moved here around 2-3 years ago when she switched to CBD oil which helps but does not feel like enough. She has previously tried Zoloft and Prozac which she took each for 6 months but did not like the side effects and eventually discontinued. With Zoloft and Prozac she experienced weight gain, vertigo and somnolence. She feels she may have a little depression but anxiety is much more bothersome. She sleeps well and sometimes excessively.  Poor appetite for the last 8 months or so since starting night shift.  Occasional thoughts of being better off dead but no suicidal ideation. No HI.         The following portions of the patient's history were reviewed and updated as appropriate: She  has a past medical history of Endometriosis and Irritable bowel.  She does not have any pertinent problems on file.  She  has a past surgical history that includes [place] tooth extraction.  Her family history includes Colon cancer in her maternal grandmother and mother; Hypertension in her father; Prostate cancer in her father.  She  reports that she quit smoking about 17 months ago. She has never used smokeless tobacco. She reports that she has current or past drug history. Drug: Marijuana. She reports that she does not drink alcohol.    Current Outpatient Medications   Medication Sig Dispense Refill   • busPIRone (BUSPAR) 5 MG tablet Take 1-2 tablets by mouth every 8 hours as needed for anxiety. 180 tablet 1   • Desvenlafaxine Succinate ER 25 MG tablet sustained-release 24 hour Take 1 tablet by mouth Daily. 90 tablet 1     No current facility-administered medications for this visit.        Review of Systems  Review of Systems   Constitutional: Negative for activity change,  "appetite change, chills, diaphoresis, fatigue, fever, unexpected weight gain and unexpected weight loss.   HENT: Negative for congestion, dental problem, ear pain, mouth sores, postnasal drip, rhinorrhea, sinus pressure, sneezing, sore throat, swollen glands, trouble swallowing and voice change.    Eyes: Negative for blurred vision, double vision, pain, discharge, redness, itching and visual disturbance.   Respiratory: Negative for cough, choking, chest tightness, shortness of breath and wheezing.    Cardiovascular: Negative for chest pain, palpitations and leg swelling.   Gastrointestinal: Negative for abdominal distention, abdominal pain, blood in stool, constipation, diarrhea, nausea, rectal pain, vomiting, GERD and indigestion.   Endocrine: Negative for cold intolerance, heat intolerance, polydipsia, polyphagia and polyuria.   Genitourinary: Negative for breast discharge, breast lump, breast pain, decreased libido, decreased urine volume, difficulty urinating, dysuria, flank pain, frequency, hematuria, pelvic pain, urgency, vaginal bleeding and vaginal pain.   Musculoskeletal: Negative for arthralgias, back pain, gait problem, myalgias and neck pain.   Skin: Negative for color change, rash and skin lesions.   Allergic/Immunologic: Negative for immunocompromised state.   Neurological: Negative for dizziness, tremors, facial asymmetry, speech difficulty, weakness, light-headedness, numbness, headache, memory problem and confusion.   Hematological: Negative for adenopathy. Does not bruise/bleed easily.   Psychiatric/Behavioral: Positive for dysphoric mood and depressed mood. Negative for agitation, behavioral problems, decreased concentration, hallucinations, self-injury, sleep disturbance, suicidal ideas, negative for hyperactivity and stress. The patient is nervous/anxious.          Objective    /70   Pulse 61   Temp 98.4 °F (36.9 °C)   Ht 165.1 cm (65\")   Wt 68.9 kg (152 lb)   SpO2 100%   BMI 25.29 " kg/m²   Physical Exam   Constitutional: She is oriented to person, place, and time. She appears well-developed and well-nourished. No distress.   HENT:   Head: Normocephalic and atraumatic.   Eyes: Pupils are equal, round, and reactive to light. Conjunctivae and EOM are normal.   Neck: Normal range of motion. Neck supple.   Cardiovascular: Normal rate, regular rhythm and normal heart sounds. Exam reveals no gallop and no friction rub.   No murmur heard.  Pulmonary/Chest: Effort normal and breath sounds normal. No respiratory distress. She has no wheezes. She has no rales.   Abdominal: Soft. Bowel sounds are normal. She exhibits no mass. There is no tenderness. No hernia.   Musculoskeletal: Normal range of motion. She exhibits no edema, tenderness or deformity.   Lymphadenopathy:     She has no cervical adenopathy.   Neurological: She is alert and oriented to person, place, and time. She displays normal reflexes. No cranial nerve deficit or sensory deficit. She exhibits normal muscle tone. Coordination normal.   Skin: Skin is warm and dry. Capillary refill takes less than 2 seconds. No rash noted. She is not diaphoretic.   Psychiatric: She has a normal mood and affect. Her behavior is normal. Judgment and thought content normal.   Nursing note and vitals reviewed.        Assessment/Plan      Diagnosis Plan   1. Encounter to establish care     2. Generalized anxiety disorder  Begin: Desvenlafaxine Succinate ER 25 MG tablet sustained-release 24 hour      Begin: busPIRone (BUSPAR) 5 MG tablet    Risks, benefits and potential side effects of medications reviewed and patient agrees to use.         Ambulatory Referral to Psychology     Return in 1 month for follow up with physical.       Return in about 1 month (around 7/25/2020) for Annual physical.             BALJEET Patricio  06/25/2020  2:43 PM

## 2020-07-27 ENCOUNTER — OFFICE VISIT (OUTPATIENT)
Dept: INTERNAL MEDICINE | Facility: CLINIC | Age: 25
End: 2020-07-27

## 2020-07-27 VITALS
SYSTOLIC BLOOD PRESSURE: 111 MMHG | HEIGHT: 65 IN | HEART RATE: 92 BPM | TEMPERATURE: 97.3 F | DIASTOLIC BLOOD PRESSURE: 76 MMHG | WEIGHT: 154 LBS | OXYGEN SATURATION: 99 % | BODY MASS INDEX: 25.66 KG/M2

## 2020-07-27 DIAGNOSIS — F41.1 GENERALIZED ANXIETY DISORDER: ICD-10-CM

## 2020-07-27 DIAGNOSIS — Z00.00 ANNUAL PHYSICAL EXAM: Primary | ICD-10-CM

## 2020-07-27 PROCEDURE — 99395 PREV VISIT EST AGE 18-39: CPT | Performed by: PHYSICIAN ASSISTANT

## 2020-07-27 RX ORDER — DESVENLAFAXINE SUCCINATE 50 MG/1
50 TABLET, EXTENDED RELEASE ORAL DAILY
Qty: 90 TABLET | Refills: 1 | Status: SHIPPED | OUTPATIENT
Start: 2020-07-27 | End: 2020-12-17 | Stop reason: SDUPTHER

## 2020-07-27 NOTE — PROGRESS NOTES
Juan Salazar is a 24 y.o. female and is here for a comprehensive physical exam. The patient reports no new problems.    HPI: She began Pristiq 25 mg daily and Buspar 5-10 mg tid prn around 1 month ago for treatment of anxiety. She has found both pristiq and buspar helpful but feels there is room for improvement with Pristiq. No side effects noted. She is sleeping well. She denies SI or HI. Mild dysphoric mood has improved since medications were initiated.         Health Habits:  Eye exam within last 2 years? No.   Dental exam every 6 months? Yes.   Exercise habits: active at work.  Healthy diet? yes    The ASCVD Risk score (Joshuaaida MOSELEY Jr., et al., 2013) failed to calculate for the following reasons:    The 2013 ASCVD risk score is only valid for ages 40 to 79        Do you take any herbs or supplements that were not prescribed by a doctor? no  Are you taking calcium supplements? No  Are you taking aspirin daily? No     History:  LMP: No LMP recorded.  Menopause: No  Last pap date:   Abnormal pap? no  Family history of breast or ovarian cancer: no         OB History    Para Term  AB Living   0 0 0 0 0 0   SAB TAB Ectopic Molar Multiple Live Births   0 0 0 0 0 0     Sexual activity questions deferred to the physician.        The following portions of the patient's history were reviewed and updated as appropriate: She  has a past medical history of Endometriosis and Irritable bowel.  She does not have any pertinent problems on file.  She  has a past surgical history that includes [place] tooth extraction.  Her family history includes Colon cancer in her maternal grandmother; Colon polyps in her mother; Hypertension in her father; Prostate cancer in her father.  She  reports that she has been smoking. She has never used smokeless tobacco. She reports that she has current or past drug history. Drug: Marijuana. She reports that she does not drink alcohol.  Current Outpatient Medications    Medication Sig Dispense Refill   • busPIRone (BUSPAR) 5 MG tablet Take 1-2 tablets by mouth every 8 hours as needed for anxiety. 180 tablet 1   • desvenlafaxine (PRISTIQ) 50 MG 24 hr tablet Take 1 tablet by mouth Daily. 90 tablet 1     No current facility-administered medications for this visit.        Review of Systems  Do you have pain that bothers you in your daily life? no    Review of Systems   Constitutional: Negative for activity change, appetite change, chills, diaphoresis, fatigue, fever, unexpected weight gain and unexpected weight loss.   HENT: Negative for congestion, dental problem, ear pain, mouth sores, postnasal drip, rhinorrhea, sinus pressure, sneezing, sore throat, swollen glands, trouble swallowing and voice change.    Eyes: Negative for blurred vision, double vision, pain, discharge, redness, itching and visual disturbance.   Respiratory: Negative for cough, choking, chest tightness, shortness of breath and wheezing.    Cardiovascular: Negative for chest pain, palpitations and leg swelling.   Gastrointestinal: Negative for abdominal distention, abdominal pain, blood in stool, constipation, diarrhea, nausea, rectal pain, vomiting, GERD and indigestion.   Endocrine: Negative for cold intolerance, heat intolerance, polydipsia, polyphagia and polyuria.   Genitourinary: Negative for breast discharge, breast lump, breast pain, decreased libido, decreased urine volume, difficulty urinating, dysuria, flank pain, frequency, hematuria, pelvic pain, urgency, vaginal bleeding and vaginal pain.   Musculoskeletal: Negative for arthralgias, back pain, gait problem, myalgias and neck pain.   Skin: Negative for color change, rash and skin lesions.   Allergic/Immunologic: Negative for environmental allergies and immunocompromised state.   Neurological: Negative for dizziness, tremors, facial asymmetry, speech difficulty, weakness, light-headedness, numbness, headache, memory problem and confusion.  "  Hematological: Negative for adenopathy. Does not bruise/bleed easily.   Psychiatric/Behavioral: Positive for dysphoric mood (improved). Negative for agitation, behavioral problems, decreased concentration, hallucinations, self-injury, sleep disturbance, suicidal ideas, negative for hyperactivity, depressed mood and stress. The patient is nervous/anxious.          Objective   /76   Pulse 92   Temp 97.3 °F (36.3 °C)   Ht 165.1 cm (65\")   Wt 69.9 kg (154 lb)   SpO2 99%   BMI 25.63 kg/m²     Physical Exam   Constitutional: She is oriented to person, place, and time. She appears well-developed and well-nourished. No distress.   HENT:   Head: Normocephalic and atraumatic.   Eyes: Pupils are equal, round, and reactive to light. Conjunctivae and EOM are normal. No scleral icterus.   Neck: Normal range of motion. Neck supple. No thyromegaly present.   Cardiovascular: Normal rate, regular rhythm and normal heart sounds. Exam reveals no gallop and no friction rub.   No murmur heard.  Pulmonary/Chest: Effort normal and breath sounds normal. No respiratory distress. She has no wheezes. She has no rales. She exhibits no tenderness.   Abdominal: Soft. Bowel sounds are normal. She exhibits no distension and no mass. There is no tenderness. There is no rebound.   Musculoskeletal: Normal range of motion. She exhibits no edema, tenderness or deformity.   Lymphadenopathy:     She has no cervical adenopathy.   Neurological: She is alert and oriented to person, place, and time. She displays normal reflexes. No cranial nerve deficit or sensory deficit.   Skin: Skin is warm and dry. Capillary refill takes less than 2 seconds. No rash noted. She is not diaphoretic. No pallor.   Psychiatric: She has a normal mood and affect. Her behavior is normal. Judgment and thought content normal.   Nursing note and vitals reviewed.         Assessment/Plan   Healthy female exam.     1.    Diagnosis Plan   1. Annual physical exam     2. BMI " 25.0-25.9,adult  Patient's Body mass index is 25.63 kg/m². BMI is above normal parameters. Recommendations include: exercise counseling and nutrition counseling.     3. Generalized anxiety disorder  Dose increase: desvenlafaxine (PRISTIQ) 50 MG 24 hr tablet    Continue Buspar 5 mg tid prn.          2. Patient Counseling:  --Nutrition: Stressed importance of moderation in sodium/caffeine intake, saturated fat and cholesterol, caloric balance, sufficient intake of fresh fruits, vegetables, fiber, calcium, iron, and 1 g folate supplementation if of childbearing age.   --Discussed the issue of calcium supplement, and the daily use of baby aspirin if applicable.             --Mammogram recommended every 2 years from age 40-49 and yearly beginning at age 50.  --Exercise: Stressed the importance of regular exercise.   --Substance Abuse: Discussed cessation/primary prevention of tobacco (if applicable), alcohol, or other drug use (if applicable); driving or other dangerous activities under the influence; availability of treatment for abuse.    --Sexuality: Discussed sexually transmitted diseases, partner selection, use of condoms, avoidance of unintended pregnancy  and contraceptive alternatives.   --Injury prevention: Discussed safety belts, safety helmets, smoke detector, smoking near bedding or upholstery.   --Dental health: Discussed importance of regular tooth brushing, flossing, and dental visits every 6 months.  --Immunizations reviewed.  --Discussed benefits of screening colonoscopy (if applicable).  --After hours service discussed with patient.    3. Discussed the patient's BMI with her.  The BMI is above average; BMI management plan is completed  4. Return in about 1 year (around 7/27/2021) for Annual physical.         BALJEET Patricio  07/27/2020  10:34 AM

## 2020-12-11 ENCOUNTER — TELEPHONE (OUTPATIENT)
Dept: INTERNAL MEDICINE | Facility: CLINIC | Age: 25
End: 2020-12-11

## 2020-12-11 PROCEDURE — U0004 COV-19 TEST NON-CDC HGH THRU: HCPCS | Performed by: NURSE PRACTITIONER

## 2020-12-11 NOTE — TELEPHONE ENCOUNTER
PATIENT ON PRISTIQ, HAVING SOME BAD SIDE EFFECTS.  PATIENT HAVING EXCESSIVE SWEATING HEAD TO TOE, VERTIGO, VOMITING. PATIENT STATES FEELS LIKE WITHDRAWALS BUT SHE TAKES IT EVERY DAY AS DIRECTED.      PLEASE CALL PATIENT [phone]

## 2020-12-11 NOTE — TELEPHONE ENCOUNTER
If she has been on it since July at this dose I do not think it is related to medication, she may be getting sick and should get checked out.

## 2020-12-17 ENCOUNTER — OFFICE VISIT (OUTPATIENT)
Dept: INTERNAL MEDICINE | Facility: CLINIC | Age: 25
End: 2020-12-17

## 2020-12-17 VITALS
SYSTOLIC BLOOD PRESSURE: 108 MMHG | TEMPERATURE: 98 F | HEIGHT: 65 IN | HEART RATE: 77 BPM | DIASTOLIC BLOOD PRESSURE: 70 MMHG | OXYGEN SATURATION: 100 % | BODY MASS INDEX: 24.83 KG/M2 | WEIGHT: 149 LBS

## 2020-12-17 DIAGNOSIS — F39 MOOD DISORDER (HCC): Primary | ICD-10-CM

## 2020-12-17 DIAGNOSIS — F41.1 GENERALIZED ANXIETY DISORDER: ICD-10-CM

## 2020-12-17 PROCEDURE — 99213 OFFICE O/P EST LOW 20 MIN: CPT | Performed by: PHYSICIAN ASSISTANT

## 2020-12-17 RX ORDER — DESVENLAFAXINE 25 MG/1
25 TABLET, EXTENDED RELEASE ORAL DAILY
Qty: 30 TABLET | Refills: 2 | Status: SHIPPED | OUTPATIENT
Start: 2020-12-17 | End: 2021-01-19 | Stop reason: SINTOL

## 2020-12-17 NOTE — PROGRESS NOTES
Kelley Salazar is a 24 y.o. female.     Subjective   History of Present Illness   Here today with concern of excessive sweating and intermittent dizziness since increasing from 25 to 50 mg of Pristiq daily. She also feels Pristiq has not been helping control depression and anxiety as well as it previously did. Buspar helps some with anxiety. She is concerned with possibility of borderline personality disorder due to fear of abandonment, rapid mood swings, feeling easily agitated, and some risky/inappropriate behaviors.  She previously declined a behavioral health referral but has now given it more thought and would like to proceed.  No suicidal or homicidal ideation.          The following portions of the patient's history were reviewed and updated as appropriate: allergies, current medications, past family history, past medical history, past social history, past surgical history and problem list.    Review of Systems   Constitutional: Positive for diaphoresis. Negative for activity change, chills, fatigue and fever.   HENT: Negative.    Eyes: Negative.  Negative for visual disturbance.   Respiratory: Negative for cough, chest tightness, shortness of breath and wheezing.    Cardiovascular: Negative for chest pain, palpitations and leg swelling.   Gastrointestinal: Negative for abdominal pain, constipation, diarrhea, nausea and vomiting.   Endocrine: Negative for polydipsia, polyphagia and polyuria.   Genitourinary: Negative.    Musculoskeletal: Negative.    Skin: Negative.    Allergic/Immunologic: Negative for immunocompromised state.   Neurological: Positive for dizziness and light-headedness. Negative for speech difficulty, weakness, headache and confusion.   Hematological: Negative for adenopathy. Does not bruise/bleed easily.   Psychiatric/Behavioral: Positive for agitation, behavioral problems, decreased concentration, dysphoric mood and depressed mood. Negative for sleep disturbance and suicidal ideas. The  "patient is nervous/anxious.          Objective    Physical Exam  Vitals signs and nursing note reviewed.   Constitutional:       General: She is not in acute distress.     Appearance: Normal appearance. She is well-developed and normal weight. She is ill-appearing and toxic-appearing. She is not diaphoretic.   HENT:      Head: Normocephalic and atraumatic.      Right Ear: External ear normal.      Left Ear: External ear normal.   Eyes:      General: No scleral icterus.     Extraocular Movements: Extraocular movements intact.      Pupils: Pupils are equal, round, and reactive to light.   Neck:      Musculoskeletal: Normal range of motion and neck supple. No neck rigidity.   Pulmonary:      Effort: Pulmonary effort is normal. No respiratory distress.   Musculoskeletal:         General: No signs of injury.      Right lower leg: No edema.      Left lower leg: No edema.   Skin:     General: Skin is warm.      Coloration: Skin is not jaundiced or pale.      Findings: No bruising, erythema, lesion or rash.   Neurological:      Mental Status: She is alert and oriented to person, place, and time.      Coordination: Coordination normal.   Psychiatric:         Mood and Affect: Mood normal.         Behavior: Behavior normal.         Thought Content: Thought content normal.         Judgment: Judgment normal.           /70   Pulse 77   Temp 98 °F (36.7 °C)   Ht 165.1 cm (65\")   Wt 67.6 kg (149 lb)   SpO2 100%   BMI 24.79 kg/m²     Nursing note and vitals reviewed.          Assessment/Plan   Diagnoses and all orders for this visit:    1. Mood disorder (CMS/HCC) (Primary)  -     Ambulatory Referral to Psychiatry    2. Generalized anxiety disorder  -     Dose decreased today: Desvenlafaxine Succinate ER 25 MG tablet sustained-release 24 hour; Take 1 tablet by mouth Daily.  Dispense: 30 tablet; Refill: 2  -     Ambulatory Referral to Psychiatry    She will decrease Pristiq from 50 to 25 mg daily as it was previously " well-tolerated without side effects of diaphoresis and dizziness.  After around 2 weeks at 25 mg daily she may further decrease to take it every other day while awaiting behavioral health appointment.             Return if symptoms worsen or fail to improve.      BALJEET Patricio  12/17/2020  14:20 EST

## 2021-01-19 ENCOUNTER — OFFICE VISIT (OUTPATIENT)
Dept: BEHAVIORAL HEALTH | Facility: CLINIC | Age: 26
End: 2021-01-19

## 2021-01-19 VITALS
HEIGHT: 65 IN | BODY MASS INDEX: 25.49 KG/M2 | WEIGHT: 153 LBS | DIASTOLIC BLOOD PRESSURE: 70 MMHG | TEMPERATURE: 97.5 F | SYSTOLIC BLOOD PRESSURE: 110 MMHG | HEART RATE: 88 BPM | OXYGEN SATURATION: 98 %

## 2021-01-19 DIAGNOSIS — F33.1 MODERATE EPISODE OF RECURRENT MAJOR DEPRESSIVE DISORDER (HCC): ICD-10-CM

## 2021-01-19 DIAGNOSIS — F41.9 ANXIETY: Primary | ICD-10-CM

## 2021-01-19 PROCEDURE — 90791 PSYCH DIAGNOSTIC EVALUATION: CPT | Performed by: COUNSELOR
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
## 2021-04-26 ENCOUNTER — OFFICE VISIT (OUTPATIENT)
Dept: INTERNAL MEDICINE | Facility: CLINIC | Age: 26
End: 2021-04-26

## 2021-04-26 VITALS
HEART RATE: 105 BPM | SYSTOLIC BLOOD PRESSURE: 94 MMHG | HEIGHT: 65 IN | DIASTOLIC BLOOD PRESSURE: 60 MMHG | TEMPERATURE: 97.6 F | WEIGHT: 145 LBS | BODY MASS INDEX: 24.16 KG/M2 | OXYGEN SATURATION: 99 %

## 2021-04-26 DIAGNOSIS — N63.10 BREAST MASS, RIGHT: Primary | ICD-10-CM

## 2021-04-26 PROCEDURE — 99213 OFFICE O/P EST LOW 20 MIN: CPT | Performed by: PHYSICIAN ASSISTANT

## 2021-04-26 NOTE — PROGRESS NOTES
"     Follow Up Office Visit      Patient Name: Kelley Salazar  : 1995   MRN: [id number]     Chief Complaint:    Chief Complaint   Patient presents with   • Breast Mass       History of Present Illness: Kelley Salazar is a 25 y.o. female who is here today with concern of a lump in the right breast which she first noticed a couple of days ago. The mass is non-tender. No nipple discharge. No previous similar occurrences. LMP unknown due to Mirena IUD causing irregular menses. No known family history of breast cancer.        Subjective      I have reviewed and the following portions of the patient's history were updated as appropriate: past family history, past medical history, past social history, past surgical history and problem list.      Current Outpatient Medications:   •  busPIRone (BUSPAR) 5 MG tablet, Take 1-2 tablets by mouth every 8 hours as needed for anxiety., Disp: 180 tablet, Rfl: 1  •  levonorgestrel (Mirena, 52 MG,) 20 MCG/24HR IUD, 1 each by Intrauterine route 1 (One) Time., Disp: , Rfl:     No Known Allergies    Objective     Physical Exam:  Vital Signs:   Vitals:    21 1552   BP: 94/60   Pulse: 105   Temp: 97.6 °F (36.4 °C)   SpO2: 99%   Weight: 65.8 kg (145 lb)   Height: 165.1 cm (65\")     Body mass index is 24.13 kg/m².    Physical Exam  Vitals and nursing note reviewed. Exam conducted with a chaperone present.   Constitutional:       General: She is not in acute distress.     Appearance: Normal appearance. She is well-developed and normal weight. She is not ill-appearing, toxic-appearing or diaphoretic.   HENT:      Head: Normocephalic and atraumatic.      Right Ear: External ear normal.      Left Ear: External ear normal.   Pulmonary:      Effort: Pulmonary effort is normal. No respiratory distress.   Chest:      Breasts: Breasts are symmetrical.         Right: Mass present. No swelling, bleeding, inverted nipple, nipple discharge, skin change or tenderness.         Left: No " inverted nipple.          Comments: Chaperone Vicki Faria CMA.  Musculoskeletal:      Cervical back: Normal range of motion. No rigidity.   Lymphadenopathy:      Upper Body:      Right upper body: No supraclavicular or axillary adenopathy.   Skin:     General: Skin is warm.      Coloration: Skin is not jaundiced or pale.      Findings: No erythema or rash.   Neurological:      General: No focal deficit present.      Mental Status: She is alert and oriented to person, place, and time.      Motor: No weakness.      Coordination: Coordination normal.      Gait: Gait normal.   Psychiatric:         Mood and Affect: Mood normal.         Behavior: Behavior normal.         Thought Content: Thought content normal.         Judgment: Judgment normal.               Assessment / Plan      Assessment/Plan:   Diagnoses and all orders for this visit:    1. Breast mass, right (Primary)  -     US breast right limited; Future    Mass feels cyst-like but will evaluate with ultrasound to confirm.        Follow Up:   No follow-ups on file.    Patient was given instructions and counseling regarding her condition or for health maintenance advice. Please see specific information pulled into the AVS if appropriate.     Judit Galvan PA-C  [facility]

## 2021-05-05 ENCOUNTER — APPOINTMENT (OUTPATIENT)
Dept: ULTRASOUND IMAGING | Facility: HOSPITAL | Age: 26
End: 2021-05-05

## 2021-05-19 ENCOUNTER — APPOINTMENT (OUTPATIENT)
Dept: ULTRASOUND IMAGING | Facility: HOSPITAL | Age: 26
End: 2021-05-19

## 2021-09-15 ENCOUNTER — OFFICE VISIT (OUTPATIENT)
Dept: OBSTETRICS AND GYNECOLOGY | Facility: CLINIC | Age: 26
End: 2021-09-15

## 2021-09-15 VITALS
BODY MASS INDEX: 20.99 KG/M2 | HEIGHT: 65 IN | WEIGHT: 126 LBS | DIASTOLIC BLOOD PRESSURE: 64 MMHG | SYSTOLIC BLOOD PRESSURE: 100 MMHG

## 2021-09-15 DIAGNOSIS — Z11.3 ROUTINE SCREENING FOR STI (SEXUALLY TRANSMITTED INFECTION): ICD-10-CM

## 2021-09-15 DIAGNOSIS — Z01.419 WELL WOMAN EXAM WITH ROUTINE GYNECOLOGICAL EXAM: Primary | ICD-10-CM

## 2021-09-15 DIAGNOSIS — Z97.5 IUD (INTRAUTERINE DEVICE) IN PLACE: ICD-10-CM

## 2021-09-15 DIAGNOSIS — N60.01 CYST OF RIGHT BREAST: ICD-10-CM

## 2021-09-15 DIAGNOSIS — Z12.4 SCREENING FOR MALIGNANT NEOPLASM OF CERVIX: ICD-10-CM

## 2021-09-15 PROBLEM — N92.0 MENORRHAGIA WITH REGULAR CYCLE: Status: RESOLVED | Noted: 2020-01-29 | Resolved: 2021-09-15

## 2021-09-15 PROBLEM — G89.29 CHRONIC PELVIC PAIN IN FEMALE: Status: RESOLVED | Noted: 2018-08-29 | Resolved: 2021-09-15

## 2021-09-15 PROBLEM — R10.2 CHRONIC PELVIC PAIN IN FEMALE: Status: RESOLVED | Noted: 2018-08-29 | Resolved: 2021-09-15

## 2021-09-15 PROBLEM — N94.6 DYSMENORRHEA: Status: RESOLVED | Noted: 2020-01-29 | Resolved: 2021-09-15

## 2021-09-15 PROCEDURE — 99395 PREV VISIT EST AGE 18-39: CPT | Performed by: OBSTETRICS & GYNECOLOGY

## 2021-09-21 PROBLEM — N60.01 CYST OF RIGHT BREAST: Status: ACTIVE | Noted: 2021-09-21

## 2021-09-21 LAB
A VAGINAE DNA VAG QL NAA+PROBE: ABNORMAL SCORE
BVAB2 DNA VAG QL NAA+PROBE: ABNORMAL SCORE
C ALBICANS DNA VAG QL NAA+PROBE: NEGATIVE
C GLABRATA DNA VAG QL NAA+PROBE: NEGATIVE
C TRACH DNA VAG QL NAA+PROBE: NEGATIVE
MEGA1 DNA VAG QL NAA+PROBE: ABNORMAL SCORE
N GONORRHOEA DNA VAG QL NAA+PROBE: NEGATIVE
T VAGINALIS DNA VAG QL NAA+PROBE: NEGATIVE

## 2021-09-21 NOTE — PROGRESS NOTES
Annual Well Woman Visit    Subjective   Chief Complaint   Patient presents with   • Gynecologic Exam     Last pap 2018, c/o lumps on right breast.     Kelley Salazar is a 25 y.o. year old  presenting to be seen for an annual well woman visit.    She is due for a Pap smear.  She is doing well with her IUD, no complaints.  She does want testing for STIs.  She is also noted some small lumps in her right breast that she would like assessed today. She denies sexual dysfunction. She denies urinary complaints including incontinence.    OB Hx: nulliparous  Contraception: IUD  Pap smear: 2018  Mammogram: never  Colonoscopy: never  DEXA Scan: never    Past Medical History:   Diagnosis Date   • Endometriosis    • Irritable bowel      Past Surgical History:   Procedure Laterality Date   • WISDOM TOOTH EXTRACTION       Family History   Problem Relation Age of Onset   • Hypertension Father    • Prostate cancer Father    • Colon cancer Maternal Grandmother    • Colon polyps Mother      Social History     Tobacco Use   • Smoking status: Former Smoker     Packs/day: 0.25     Years: 1.00     Pack years: 0.25     Types: Cigarettes     Quit date:      Years since quittin.7   • Smokeless tobacco: Never Used   • Tobacco comment: Social/not every day   Substance Use Topics   • Alcohol use: No   • Drug use: Yes     Types: Marijuana     Comment: stopped in 2017     (Not in a hospital admission)    Patient has no known allergies.  Current Outpatient Medications on File Prior to Visit   Medication Sig Dispense Refill   • busPIRone (BUSPAR) 5 MG tablet Take 1-2 tablets by mouth every 8 hours as needed for anxiety. 180 tablet 1   • levonorgestrel (Mirena, 52 MG,) 20 MCG/24HR IUD 1 each by Intrauterine route 1 (One) Time.       No current facility-administered medications on file prior to visit.     Social History    Tobacco Use      Smoking status: Former Smoker        Packs/day: 0.25        Years: 1.00        Pack years: .25      "   Types: Cigarettes        Quit date: 2019        Years since quittin.7      Smokeless tobacco: Never Used      Tobacco comment: Social/not every day      Review of Systems  Pertinent items are noted in HPI, all other systems were reviewed and negative       Objective   /64   Ht 165.1 cm (65\")   Wt 57.2 kg (126 lb)   BMI 20.97 kg/m²     Physical Exam:  General Appearance: alert, pleasant, appears stated age, interactive and cooperative  Breasts: Examined in supine position  Nipples normal without inversion, lesions or discharge  There are no palpable axillary nodes  Right breast contains some small cysts in the 10:00 region.  Abdomen: no masses, no hepatomegaly, no splenomegaly, soft non-tender, no guarding and no rebound tenderness    Pelvis:  Pelvic: Clinical staff was present for exam  External genitalia:  normal appearance of the external genitalia including Bartholin's and Lemont's glands.  :  urethral meatus normal;  Vagina:  normal pink mucosa without prolapse or lesions.  Cervix:  normal appearance. IUD string present - 3 cms in length;  Uterus:  normal size, shape and consistency.  Adnexa:  normal bimanual exam of the adnexa.  Rectal:  digital rectal exam not performed; anus visually normal appearing.       Assessment   Annual well woman exam with age appropriate screening  Right breast cysts  IUD in place     Plan    Orders Placed This Encounter   Procedures   • NuSwab VG+ - Swab, Cervix     Order Specific Question:   Release to patient     Answer:   Immediate   • US Breast Right Complete     Standing Status:   Future     Standing Expiration Date:   3/14/2022     Order Specific Question:   Reason for Exam:     Answer:   Breast cystzs     Medications ordered: None    Procedures performed: Pap smear    - Mammogram: Not indicated, but breast ultrasound ordered  - Pap screening guidelines reviewed; pap smear collected today  - Yearly clinical breast and pelvic exams recommended regardless of pap " recommendations  - Dexa scan: not indicated   - Colonoscopy: not indicated   - Healthy diet and exercise encouraged  - Calcium and Vitamin D requirements reviewed  - Contraception: IUD  - Screening: STI screening today    Follow up for annual visits    Samuel Estrella MD  Obstetrics and Gynecology  [facility]

## 2021-09-22 DIAGNOSIS — B96.89 BV (BACTERIAL VAGINOSIS): Primary | ICD-10-CM

## 2021-09-22 DIAGNOSIS — N76.0 BV (BACTERIAL VAGINOSIS): Primary | ICD-10-CM

## 2021-09-22 RX ORDER — METRONIDAZOLE 500 MG/1
500 TABLET ORAL 2 TIMES DAILY
Qty: 14 TABLET | Refills: 0 | Status: SHIPPED | OUTPATIENT
Start: 2021-09-22 | End: 2021-10-01

## 2021-09-24 ENCOUNTER — TELEPHONE (OUTPATIENT)
Dept: OBSTETRICS AND GYNECOLOGY | Facility: CLINIC | Age: 26
End: 2021-09-24

## 2021-09-24 DIAGNOSIS — B37.9 YEAST INFECTION: Primary | ICD-10-CM

## 2021-09-24 RX ORDER — FLUCONAZOLE 150 MG/1
150 TABLET ORAL DAILY
Qty: 1 TABLET | Refills: 0 | Status: SHIPPED | OUTPATIENT
Start: 2021-09-24 | End: 2022-06-24

## 2021-10-01 DIAGNOSIS — Z12.4 SCREENING FOR MALIGNANT NEOPLASM OF CERVIX: ICD-10-CM

## 2021-10-08 ENCOUNTER — HOSPITAL ENCOUNTER (OUTPATIENT)
Dept: ULTRASOUND IMAGING | Facility: HOSPITAL | Age: 26
Discharge: HOME | End: 2021-10-08
Admitting: OBSTETRICS & GYNECOLOGY

## 2021-10-08 DIAGNOSIS — N60.01 CYST OF RIGHT BREAST: ICD-10-CM

## 2021-10-08 PROCEDURE — 76641 ULTRASOUND BREAST COMPLETE: CPT

## 2021-10-11 ENCOUNTER — TELEPHONE (OUTPATIENT)
Dept: OBSTETRICS AND GYNECOLOGY | Facility: CLINIC | Age: 26
End: 2021-10-11

## 2021-10-11 NOTE — TELEPHONE ENCOUNTER
----- Message from Sarah Muhammad sent at 10/11/2021  2:54 PM EDT -----  Janey  informed pt of her breast us. But the pt said she is having a lot of pain. She asked what Dr Estrella recommends.

## 2021-10-12 DIAGNOSIS — D24.9 FIBROADENOMA OF BREAST, UNSPECIFIED LATERALITY: Primary | ICD-10-CM

## 2021-10-12 DIAGNOSIS — N64.4 BREAST PAIN: ICD-10-CM

## 2021-10-12 NOTE — TELEPHONE ENCOUNTER
I recommend meeting with a breast surgeon to discuss possible interventions.    Jade - I have placed that referral order, please coordinate that consultation

## 2021-10-26 ENCOUNTER — TELEPHONE (OUTPATIENT)
Dept: SURGERY | Facility: CLINIC | Age: 26
End: 2021-10-26

## 2021-10-26 NOTE — TELEPHONE ENCOUNTER
[facility] Called patient no answer,left message to call office. No show letter mailed to patient

## 2022-06-24 ENCOUNTER — OFFICE VISIT (OUTPATIENT)
Dept: INTERNAL MEDICINE | Facility: CLINIC | Age: 27
End: 2022-06-24

## 2022-06-24 VITALS
HEART RATE: 78 BPM | WEIGHT: 124 LBS | OXYGEN SATURATION: 100 % | DIASTOLIC BLOOD PRESSURE: 75 MMHG | BODY MASS INDEX: 20.66 KG/M2 | HEIGHT: 65 IN | TEMPERATURE: 98 F | RESPIRATION RATE: 15 BRPM | SYSTOLIC BLOOD PRESSURE: 117 MMHG

## 2022-06-24 DIAGNOSIS — K52.9 GASTROENTERITIS: ICD-10-CM

## 2022-06-24 DIAGNOSIS — D24.2 FIBROADENOMA OF BOTH BREASTS: ICD-10-CM

## 2022-06-24 DIAGNOSIS — D24.1 FIBROADENOMA OF BOTH BREASTS: ICD-10-CM

## 2022-06-24 DIAGNOSIS — N63.0 BREAST MASS IN FEMALE: ICD-10-CM

## 2022-06-24 DIAGNOSIS — B37.31 YEAST VAGINITIS: ICD-10-CM

## 2022-06-24 DIAGNOSIS — Z23 NEED FOR COVID-19 VACCINE: Primary | ICD-10-CM

## 2022-06-24 PROCEDURE — 99214 OFFICE O/P EST MOD 30 MIN: CPT | Performed by: INTERNAL MEDICINE

## 2022-06-24 PROCEDURE — 0053A COVID-19 (PFIZER) 12+ YRS: CPT | Performed by: INTERNAL MEDICINE

## 2022-06-24 PROCEDURE — 91305 COVID-19 (PFIZER) 12+ YRS: CPT | Performed by: INTERNAL MEDICINE

## 2022-06-24 RX ORDER — AZITHROMYCIN 250 MG/1
TABLET, FILM COATED ORAL
Qty: 6 TABLET | Refills: 0 | Status: SHIPPED | OUTPATIENT
Start: 2022-06-24

## 2022-06-24 RX ORDER — FLUCONAZOLE 150 MG/1
150 TABLET ORAL ONCE
Qty: 1 TABLET | Refills: 0 | Status: SHIPPED | OUTPATIENT
Start: 2022-06-24 | End: 2022-06-25
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
## 2022-07-01 ENCOUNTER — TRANSCRIBE ORDERS (OUTPATIENT)
Dept: MAMMOGRAPHY | Facility: HOSPITAL | Age: 27
End: 2022-07-01

## 2022-07-01 ENCOUNTER — HOSPITAL ENCOUNTER (OUTPATIENT)
Dept: ULTRASOUND IMAGING | Facility: HOSPITAL | Age: 27
Discharge: HOME | End: 2022-07-01
Admitting: INTERNAL MEDICINE

## 2022-07-01 DIAGNOSIS — D24.1 FIBROADENOMA OF BOTH BREASTS: ICD-10-CM

## 2022-07-01 DIAGNOSIS — N63.0 BREAST MASS IN FEMALE: ICD-10-CM

## 2022-07-01 DIAGNOSIS — R92.8 ABNORMAL MAMMOGRAM: Primary | ICD-10-CM

## 2022-07-01 DIAGNOSIS — D24.2 FIBROADENOMA OF BOTH BREASTS: ICD-10-CM

## 2022-07-01 PROCEDURE — 76642 ULTRASOUND BREAST LIMITED: CPT

## 2022-07-01 PROCEDURE — 76642 ULTRASOUND BREAST LIMITED: CPT | Performed by: RADIOLOGY

## 2023-01-03 ENCOUNTER — APPOINTMENT (OUTPATIENT)
Dept: ULTRASOUND IMAGING | Facility: HOSPITAL | Age: 28
End: 2023-01-03